# Patient Record
(demographics unavailable — no encounter records)

---

## 2024-12-10 NOTE — ADDENDUM
[FreeTextEntry1] : I, Madhav Lizarraga, acted as a scribe on behalf of Dr. Lamont Hong MD, on 12/09/2024.   All medical entries made by the scribe were at my, Dr. Lamont Hong MD, direction and personally dictated by me on 12/09/2024. I have reviewed the chart and agree that the record accurately reflects my personal performance of the history, physical exam, assessment and plan. I have also personally directed, reviewed, and agreed with the chart.

## 2024-12-10 NOTE — HEALTH RISK ASSESSMENT
[Good] : ~his/her~  mood as  good [No] : In the past 12 months have you used drugs other than those required for medical reasons? No [0] : 2) Feeling down, depressed, or hopeless: Not at all (0) [PHQ-2 Negative - No further assessment needed] : PHQ-2 Negative - No further assessment needed [Never] : Never [NO] : No [Patient reported mammogram was normal] : Patient reported mammogram was normal [FreeTextEntry1] : health maintenance [[id number]] : 0 [MammogramComments] : Due in Feb 25'.  [BoneDensityComments] : n/a [ColonoscopyComments] : n/a

## 2024-12-10 NOTE — HEALTH RISK ASSESSMENT
[Good] : ~his/her~  mood as  good [No] : In the past 12 months have you used drugs other than those required for medical reasons? No [0] : 2) Feeling down, depressed, or hopeless: Not at all (0) [PHQ-2 Negative - No further assessment needed] : PHQ-2 Negative - No further assessment needed [Never] : Never [NO] : No [Patient reported mammogram was normal] : Patient reported mammogram was normal [FreeTextEntry1] : health maintenance [[id number]] : 0 [MammogramComments] : Due in Feb 25'.  [BoneDensityComments] : n/a [ColonoscopyComments] : n/a

## 2024-12-10 NOTE — HISTORY OF PRESENT ILLNESS
[FreeTextEntry1] : Patient is present today to establish care and for a comprehensive Annual Physical Exam. [de-identified] : Patient is a 42yr old female who is present today to establish care and for a comprehensive Annual Physical Exam.  Patient confirms Hx of underactive thyroid, follows with Dr. Lemons, confirms it has been stable. Discussed apt with new ENDO scheduled for January. Requests refill of Synthroid. Denies taking Thromax anymore. Confirms having sleep apnea, uses C-Pap. Reports A1c has been previously borderline. Discussed Zepbound, Wellbutrin and Wegovy. Reports Mammo due in Feb, reports following with GYN. Reports palpitations are under control. Reports nosebleeds daily with sinuses, reports ENT visit, confirms having deviated septum. Denies having humidifier at home. Discussed moisturizing cream and Saline. Denies anxiety and depression. Reports pinch nerves are persistent, hands are numb when waking up. Denies relief with Physical Therapy. Discussed nerve study if issues persist. Denies nausea and vomiting. Confirms trying to watch diet, denies eating right before bed and denies eating quickly. Confirms FHx of diabetes, (sister), confirms she was overweight. Reports father has heart issues, recently had stents placed. Confirms Hx of reflux. Denies being on menstrual cycle on this visit, denies constipation. Discussed Pelvic ultrasound. Denies back pain. Denies having done .   Flu vaccine UTD.  Denies any CP, chest tightness or SOB. Denies any abdominal pain, urinary symptom, or change in bowel habits. Denies any fever, chills, or night sweats.

## 2024-12-10 NOTE — HISTORY OF PRESENT ILLNESS
[FreeTextEntry1] : Patient is present today to establish care and for a comprehensive Annual Physical Exam. [de-identified] : Patient is a 42yr old female who is present today to establish care and for a comprehensive Annual Physical Exam.  Patient confirms Hx of underactive thyroid, follows with Dr. Lemons, confirms it has been stable. Discussed apt with new ENDO scheduled for January. Requests refill of Synthroid. Denies taking Thromax anymore. Confirms having sleep apnea, uses C-Pap. Reports A1c has been previously borderline. Discussed Zepbound, Wellbutrin and Wegovy. Reports Mammo due in Feb, reports following with GYN. Reports palpitations are under control. Reports nosebleeds daily with sinuses, reports ENT visit, confirms having deviated septum. Denies having humidifier at home. Discussed moisturizing cream and Saline. Denies anxiety and depression. Reports pinch nerves are persistent, hands are numb when waking up. Denies relief with Physical Therapy. Discussed nerve study if issues persist. Denies nausea and vomiting. Confirms trying to watch diet, denies eating right before bed and denies eating quickly. Confirms FHx of diabetes, (sister), confirms she was overweight. Reports father has heart issues, recently had stents placed. Confirms Hx of reflux. Denies being on menstrual cycle on this visit, denies constipation. Discussed Pelvic ultrasound. Denies back pain. Denies having done .   Flu vaccine UTD.  Denies any CP, chest tightness or SOB. Denies any abdominal pain, urinary symptom, or change in bowel habits. Denies any fever, chills, or night sweats.

## 2024-12-10 NOTE — ASSESSMENT
[FreeTextEntry1] :  Annual Physical Exam: Adult hyperthyroidism, RLQ Abdominal pain, Obesity (BMI 30.0-39.9) - BP is stable. Continue current management. - Check A1c, lipid panels, vitamin levels, urine analysis, TSH, Free T4 - Order US Abdomen Complete, US Transvaginal  - Start Zepbound 2.5MG/0.5ML - Renew Synthroid 75MCG     - RTO annually or as needed.   Pt verbalized understanding and will reach should any questions/concerns occur.
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
## 2025-02-27 NOTE — REASON FOR VISIT
[Subsequent Evaluation] : a subsequent evaluation for [FreeTextEntry2] : left pressure left nasal cavity and congested clogged ears, right bloody nosebleeds.

## 2025-02-27 NOTE — CONSULT LETTER
[FreeTextEntry1] : Dear Dr. MICKIE DECKER  I had the pleasure of evaluating your patient NESHA LAMBERT, thank you for allowing us to participate in their care. please see full note detailing our visit below. If you have any questions, please do not hesitate to call me and I would be happy to discuss further.   Gagan Lazaro M.D. Attending Physician,   Department of Otolaryngology - Head and Neck Surgery [facility]  Office: [phone] Fax: [phone]

## 2025-02-27 NOTE — REVIEW OF SYSTEMS
[As Noted in HPI] : as noted in HPI [Negative] : Heme/Lymph Electrodesiccation And Curettage Text: The wound bed was treated with electrodesiccation and curettage after the biopsy was performed. Anesthesia Volume In Cc (Will Not Render If 0): 0.5 Type Of Destruction Used: Curettage Dressing: bandage Render Post-Care Instructions In Note?: no Was A Bandage Applied: Yes Silver Nitrate Text: The wound bed was treated with silver nitrate after the biopsy was performed. Billing Type: Third-Party Bill Curettage Text: The wound bed was treated with curettage after the biopsy was performed. Hemostasis: Drysol Biopsy Method: Dermablade Post-Care Instructions: I reviewed with the patient in detail post-care instructions. Patient is to keep the biopsy site dry overnight, and then apply bacitracin twice daily until healed. Patient may apply hydrogen peroxide soaks to remove any crusting. Detail Level: Detailed Lab: 445 Size Of Lesion In Cm: 0 Anesthesia Type: 1% lidocaine with epinephrine Cryotherapy Text: The wound bed was treated with cryotherapy after the biopsy was performed. Wound Care: Petrolatum Notification Instructions: Patient will be notified of biopsy results. However, patient instructed to call the office if not contacted within 2 weeks. Depth Of Biopsy: dermis Consent: Written consent was obtained and risks were reviewed including but not limited to scarring, infection, bleeding, scabbing, incomplete removal, nerve damage and allergy to anesthesia. Biopsy Type: H and E Electrodesiccation Text: The wound bed was treated with electrodesiccation after the biopsy was performed.

## 2025-02-27 NOTE — CONSULT LETTER
[FreeTextEntry1] : Dear Dr. MICKIE DECKER  I had the pleasure of evaluating your patient NESHA LAMBERT, thank you for allowing us to participate in their care. please see full note detailing our visit below. If you have any questions, please do not hesitate to call me and I would be happy to discuss further.   Gagan Lazaro M.D. Attending Physician,   Department of Otolaryngology - Head and Neck Surgery [facility]  Office: [phone] Fax: [phone]
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
## 2025-02-27 NOTE — PROCEDURE
[FreeTextEntry6] : Procedure performed: Nasal Endoscopy- Diagnostic Pre-op indication(s): nasal congestion Post-op indication(s): nasal congestion  Verbal and/or written consent obtained from patient Anterior rhinoscopy insufficient to account for symptoms Scope #: 3,  flexible fiber optic telescope  The scope was introduced in the nasal passage between the middle and inferior turbinates to exam the inferior portion of the middle meatus and the fontanelle, as well as the maxillary ostia.  Next, the scope was passed medically and posteriorly to the middle turbinates to examine the sphenoethmoid recess and the superior turbinate region. Upon visualization the finders are as follows: Nasal Septum: sigmoidal septal deviation Bilateral - Mucosa: boggy turbinates, Mucous: scant, Polyp: not seen, Inferior Turbinate: boggy, Middle Turbinate: normal, Superior Turbinate: normal, Inferior Meatus: narrow, Middle Meatus: narrow, Super Meatus:normal, Sphenoethmoidal Recess: clear thick secretions draining from L sinuses

## 2025-02-27 NOTE — END OF VISIT
[FreeTextEntry3] : I personally saw and examined the patient in detail. I spoke to ELA Hopkins regarding the assessment and plan of care.  I preformed the procedures and I reviewed the above assessment and plan of care, and agree. I have made changes in changes in the body of the note where appropriate.